# Patient Record
Sex: FEMALE | Race: BLACK OR AFRICAN AMERICAN | NOT HISPANIC OR LATINO | URBAN - METROPOLITAN AREA
[De-identification: names, ages, dates, MRNs, and addresses within clinical notes are randomized per-mention and may not be internally consistent; named-entity substitution may affect disease eponyms.]

---

## 2017-01-27 ENCOUNTER — OUTPATIENT (OUTPATIENT)
Dept: OUTPATIENT SERVICES | Facility: HOSPITAL | Age: 52
LOS: 1 days | End: 2017-01-27
Payer: COMMERCIAL

## 2017-01-27 ENCOUNTER — APPOINTMENT (OUTPATIENT)
Dept: MRI IMAGING | Facility: IMAGING CENTER | Age: 52
End: 2017-01-27

## 2017-01-27 DIAGNOSIS — Z00.8 ENCOUNTER FOR OTHER GENERAL EXAMINATION: ICD-10-CM

## 2017-01-27 DIAGNOSIS — Z90.710 ACQUIRED ABSENCE OF BOTH CERVIX AND UTERUS: Chronic | ICD-10-CM

## 2017-01-27 PROCEDURE — A9585: CPT

## 2017-01-27 PROCEDURE — 82565 ASSAY OF CREATININE: CPT

## 2017-01-27 PROCEDURE — 74183 MRI ABD W/O CNTR FLWD CNTR: CPT

## 2017-02-27 ENCOUNTER — RECORD ABSTRACTING (OUTPATIENT)
Age: 52
End: 2017-02-27

## 2017-02-27 DIAGNOSIS — Z84.89 FAMILY HISTORY OF OTHER SPECIFIED CONDITIONS: ICD-10-CM

## 2017-02-27 DIAGNOSIS — Z86.79 PERSONAL HISTORY OF OTHER DISEASES OF THE CIRCULATORY SYSTEM: ICD-10-CM

## 2017-02-27 DIAGNOSIS — Z78.9 OTHER SPECIFIED HEALTH STATUS: ICD-10-CM

## 2017-02-27 DIAGNOSIS — Z82.49 FAMILY HISTORY OF ISCHEMIC HEART DISEASE AND OTHER DISEASES OF THE CIRCULATORY SYSTEM: ICD-10-CM

## 2017-02-27 DIAGNOSIS — E66.01 MORBID (SEVERE) OBESITY DUE TO EXCESS CALORIES: ICD-10-CM

## 2017-02-27 DIAGNOSIS — I50.20 UNSPECIFIED SYSTOLIC (CONGESTIVE) HEART FAILURE: ICD-10-CM

## 2017-03-01 ENCOUNTER — APPOINTMENT (OUTPATIENT)
Dept: ELECTROPHYSIOLOGY | Facility: CLINIC | Age: 52
End: 2017-03-01

## 2017-03-01 ENCOUNTER — NON-APPOINTMENT (OUTPATIENT)
Age: 52
End: 2017-03-01

## 2017-03-01 VITALS
HEART RATE: 75 BPM | DIASTOLIC BLOOD PRESSURE: 85 MMHG | OXYGEN SATURATION: 97 % | HEIGHT: 65 IN | WEIGHT: 293 LBS | SYSTOLIC BLOOD PRESSURE: 117 MMHG | BODY MASS INDEX: 48.82 KG/M2

## 2017-03-01 DIAGNOSIS — I42.0 DILATED CARDIOMYOPATHY: ICD-10-CM

## 2017-03-01 DIAGNOSIS — I44.7 LEFT BUNDLE-BRANCH BLOCK, UNSPECIFIED: ICD-10-CM

## 2017-03-01 RX ORDER — LISINOPRIL 10 MG/1
10 TABLET ORAL DAILY
Qty: 30 | Refills: 0 | Status: ACTIVE | COMMUNITY

## 2017-03-01 RX ORDER — GABAPENTIN 600 MG/1
600 TABLET, COATED ORAL
Refills: 0 | Status: ACTIVE | COMMUNITY

## 2017-03-01 RX ORDER — SPIRONOLACTONE 25 MG/1
25 TABLET, FILM COATED ORAL DAILY
Qty: 30 | Refills: 1 | Status: ACTIVE | COMMUNITY

## 2017-03-01 RX ORDER — METOPROLOL SUCCINATE 100 MG/1
100 TABLET, EXTENDED RELEASE ORAL DAILY
Qty: 30 | Refills: 6 | Status: ACTIVE | COMMUNITY

## 2017-03-01 RX ORDER — VENLAFAXINE 75 MG/1
75 TABLET ORAL DAILY
Refills: 0 | Status: ACTIVE | COMMUNITY

## 2017-03-01 RX ORDER — ATORVASTATIN CALCIUM 20 MG/1
20 TABLET, FILM COATED ORAL
Qty: 30 | Refills: 5 | Status: ACTIVE | COMMUNITY

## 2017-03-01 RX ORDER — FUROSEMIDE 80 MG/1
80 TABLET ORAL
Refills: 0 | Status: ACTIVE | COMMUNITY

## 2017-04-28 ENCOUNTER — OUTPATIENT (OUTPATIENT)
Dept: INPATIENT UNIT | Facility: HOSPITAL | Age: 52
LOS: 1 days | End: 2017-04-28
Payer: COMMERCIAL

## 2017-04-28 VITALS
HEART RATE: 84 BPM | WEIGHT: 293 LBS | RESPIRATION RATE: 20 BRPM | DIASTOLIC BLOOD PRESSURE: 63 MMHG | SYSTOLIC BLOOD PRESSURE: 141 MMHG | TEMPERATURE: 98 F | HEIGHT: 65 IN | OXYGEN SATURATION: 95 %

## 2017-04-28 DIAGNOSIS — I42.8 OTHER CARDIOMYOPATHIES: ICD-10-CM

## 2017-04-28 DIAGNOSIS — I42 CARDIOMYOPATHY: ICD-10-CM

## 2017-04-28 DIAGNOSIS — Z90.710 ACQUIRED ABSENCE OF BOTH CERVIX AND UTERUS: Chronic | ICD-10-CM

## 2017-04-28 LAB
ALBUMIN SERPL ELPH-MCNC: 4 G/DL — SIGNIFICANT CHANGE UP (ref 3.3–5)
ALP SERPL-CCNC: 83 U/L — SIGNIFICANT CHANGE UP (ref 40–120)
ALT FLD-CCNC: 14 U/L RC — SIGNIFICANT CHANGE UP (ref 10–45)
ANION GAP SERPL CALC-SCNC: 14 MMOL/L — SIGNIFICANT CHANGE UP (ref 5–17)
AST SERPL-CCNC: 19 U/L — SIGNIFICANT CHANGE UP (ref 10–40)
BILIRUB SERPL-MCNC: 0.3 MG/DL — SIGNIFICANT CHANGE UP (ref 0.2–1.2)
BUN SERPL-MCNC: 23 MG/DL — SIGNIFICANT CHANGE UP (ref 7–23)
CALCIUM SERPL-MCNC: 9.9 MG/DL — SIGNIFICANT CHANGE UP (ref 8.4–10.5)
CHLORIDE SERPL-SCNC: 103 MMOL/L — SIGNIFICANT CHANGE UP (ref 96–108)
CO2 SERPL-SCNC: 26 MMOL/L — SIGNIFICANT CHANGE UP (ref 22–31)
CREAT SERPL-MCNC: 1.15 MG/DL — SIGNIFICANT CHANGE UP (ref 0.5–1.3)
GLUCOSE SERPL-MCNC: 148 MG/DL — HIGH (ref 70–99)
HCT VFR BLD CALC: 37 % — SIGNIFICANT CHANGE UP (ref 34.5–45)
HGB BLD-MCNC: 13.5 G/DL — SIGNIFICANT CHANGE UP (ref 11.5–15.5)
MCHC RBC-ENTMCNC: 29.9 PG — SIGNIFICANT CHANGE UP (ref 27–34)
MCHC RBC-ENTMCNC: 36.5 GM/DL — HIGH (ref 32–36)
MCV RBC AUTO: 81.8 FL — SIGNIFICANT CHANGE UP (ref 80–100)
PLATELET # BLD AUTO: 246 K/UL — SIGNIFICANT CHANGE UP (ref 150–400)
POTASSIUM SERPL-MCNC: 4.9 MMOL/L — SIGNIFICANT CHANGE UP (ref 3.5–5.3)
POTASSIUM SERPL-SCNC: 4.9 MMOL/L — SIGNIFICANT CHANGE UP (ref 3.5–5.3)
PROT SERPL-MCNC: 8.5 G/DL — HIGH (ref 6–8.3)
RBC # BLD: 4.52 M/UL — SIGNIFICANT CHANGE UP (ref 3.8–5.2)
RBC # FLD: 13 % — SIGNIFICANT CHANGE UP (ref 10.3–14.5)
SODIUM SERPL-SCNC: 143 MMOL/L — SIGNIFICANT CHANGE UP (ref 135–145)
WBC # BLD: 16.6 K/UL — HIGH (ref 3.8–10.5)
WBC # FLD AUTO: 16.6 K/UL — HIGH (ref 3.8–10.5)

## 2017-04-28 PROCEDURE — 33249 INSJ/RPLCMT DEFIB W/LEAD(S): CPT | Mod: 59,Q0

## 2017-04-28 PROCEDURE — 99152 MOD SED SAME PHYS/QHP 5/>YRS: CPT

## 2017-04-28 PROCEDURE — 93010 ELECTROCARDIOGRAM REPORT: CPT

## 2017-04-28 PROCEDURE — 33225 L VENTRIC PACING LEAD ADD-ON: CPT

## 2017-04-28 RX ORDER — CEFAZOLIN SODIUM 1 G
1000 VIAL (EA) INJECTION EVERY 8 HOURS
Qty: 0 | Refills: 0 | Status: COMPLETED | OUTPATIENT
Start: 2017-04-29 | End: 2017-04-29

## 2017-04-28 NOTE — H&P CARDIOLOGY - HISTORY OF PRESENT ILLNESS
53 yo female with PMHx of morbid obesity, HTN, LBBB & non ischemic cardiomyopathy, p/w with SOB & Lower extremity edema, uses 2 pillow  to sleep, an angiogram in 2016 reveals no significant coronary disease, Echo in december 2016 revelas reduced EF , pt has ongoingnon ischemic cardiomyopathy, class III HF, & LBBB now presents for BIV implant.

## 2017-04-28 NOTE — H&P CARDIOLOGY - PMH
Congestive heart failure (CHF)    Diabetes mellitus    Essential hypertension    HLD (hyperlipidemia)    Obesity    Other depression

## 2017-04-29 ENCOUNTER — TRANSCRIPTION ENCOUNTER (OUTPATIENT)
Age: 52
End: 2017-04-29

## 2017-04-29 VITALS
RESPIRATION RATE: 18 BRPM | TEMPERATURE: 98 F | OXYGEN SATURATION: 96 % | SYSTOLIC BLOOD PRESSURE: 128 MMHG | HEART RATE: 78 BPM | DIASTOLIC BLOOD PRESSURE: 81 MMHG

## 2017-04-29 LAB
ANION GAP SERPL CALC-SCNC: 15 MMOL/L — SIGNIFICANT CHANGE UP (ref 5–17)
BUN SERPL-MCNC: 23 MG/DL — SIGNIFICANT CHANGE UP (ref 7–23)
CALCIUM SERPL-MCNC: 8.7 MG/DL — SIGNIFICANT CHANGE UP (ref 8.4–10.5)
CHLORIDE SERPL-SCNC: 100 MMOL/L — SIGNIFICANT CHANGE UP (ref 96–108)
CO2 SERPL-SCNC: 24 MMOL/L — SIGNIFICANT CHANGE UP (ref 22–31)
CREAT SERPL-MCNC: 1.12 MG/DL — SIGNIFICANT CHANGE UP (ref 0.5–1.3)
GLUCOSE SERPL-MCNC: 249 MG/DL — HIGH (ref 70–99)
HCT VFR BLD CALC: 37.1 % — SIGNIFICANT CHANGE UP (ref 34.5–45)
HGB BLD-MCNC: 12.6 G/DL — SIGNIFICANT CHANGE UP (ref 11.5–15.5)
MCHC RBC-ENTMCNC: 28.4 PG — SIGNIFICANT CHANGE UP (ref 27–34)
MCHC RBC-ENTMCNC: 34 GM/DL — SIGNIFICANT CHANGE UP (ref 32–36)
MCV RBC AUTO: 83.4 FL — SIGNIFICANT CHANGE UP (ref 80–100)
PLATELET # BLD AUTO: 239 K/UL — SIGNIFICANT CHANGE UP (ref 150–400)
POTASSIUM SERPL-MCNC: 3.8 MMOL/L — SIGNIFICANT CHANGE UP (ref 3.5–5.3)
POTASSIUM SERPL-SCNC: 3.8 MMOL/L — SIGNIFICANT CHANGE UP (ref 3.5–5.3)
RBC # BLD: 4.45 M/UL — SIGNIFICANT CHANGE UP (ref 3.8–5.2)
RBC # FLD: 12.7 % — SIGNIFICANT CHANGE UP (ref 10.3–14.5)
SODIUM SERPL-SCNC: 139 MMOL/L — SIGNIFICANT CHANGE UP (ref 135–145)
WBC # BLD: 17.7 K/UL — HIGH (ref 3.8–10.5)
WBC # FLD AUTO: 17.7 K/UL — HIGH (ref 3.8–10.5)

## 2017-04-29 PROCEDURE — C1777: CPT

## 2017-04-29 PROCEDURE — 71045 X-RAY EXAM CHEST 1 VIEW: CPT

## 2017-04-29 PROCEDURE — 99153 MOD SED SAME PHYS/QHP EA: CPT

## 2017-04-29 PROCEDURE — C1769: CPT

## 2017-04-29 PROCEDURE — C1892: CPT

## 2017-04-29 PROCEDURE — 99152 MOD SED SAME PHYS/QHP 5/>YRS: CPT

## 2017-04-29 PROCEDURE — 80053 COMPREHEN METABOLIC PANEL: CPT

## 2017-04-29 PROCEDURE — 80048 BASIC METABOLIC PNL TOTAL CA: CPT

## 2017-04-29 PROCEDURE — 85027 COMPLETE CBC AUTOMATED: CPT

## 2017-04-29 PROCEDURE — C1887: CPT

## 2017-04-29 PROCEDURE — 93005 ELECTROCARDIOGRAM TRACING: CPT

## 2017-04-29 PROCEDURE — 93010 ELECTROCARDIOGRAM REPORT: CPT

## 2017-04-29 PROCEDURE — 33225 L VENTRIC PACING LEAD ADD-ON: CPT

## 2017-04-29 PROCEDURE — 33249 INSJ/RPLCMT DEFIB W/LEAD(S): CPT | Mod: Q0

## 2017-04-29 PROCEDURE — C1900: CPT

## 2017-04-29 PROCEDURE — C1898: CPT

## 2017-04-29 RX ORDER — ACETAMINOPHEN 500 MG
650 TABLET ORAL EVERY 6 HOURS
Qty: 0 | Refills: 0 | Status: DISCONTINUED | OUTPATIENT
Start: 2017-04-29 | End: 2017-04-29

## 2017-04-29 RX ORDER — INSULIN LISPRO 100/ML
VIAL (ML) SUBCUTANEOUS
Qty: 0 | Refills: 0 | Status: DISCONTINUED | OUTPATIENT
Start: 2017-04-29 | End: 2017-04-29

## 2017-04-29 RX ORDER — DEXTROSE 50 % IN WATER 50 %
12.5 SYRINGE (ML) INTRAVENOUS ONCE
Qty: 0 | Refills: 0 | Status: DISCONTINUED | OUTPATIENT
Start: 2017-04-29 | End: 2017-04-29

## 2017-04-29 RX ORDER — DEXTROSE 50 % IN WATER 50 %
25 SYRINGE (ML) INTRAVENOUS ONCE
Qty: 0 | Refills: 0 | Status: DISCONTINUED | OUTPATIENT
Start: 2017-04-29 | End: 2017-04-29

## 2017-04-29 RX ORDER — DEXTROSE 50 % IN WATER 50 %
1 SYRINGE (ML) INTRAVENOUS ONCE
Qty: 0 | Refills: 0 | Status: DISCONTINUED | OUTPATIENT
Start: 2017-04-29 | End: 2017-04-29

## 2017-04-29 RX ORDER — GLUCAGON INJECTION, SOLUTION 0.5 MG/.1ML
1 INJECTION, SOLUTION SUBCUTANEOUS ONCE
Qty: 0 | Refills: 0 | Status: DISCONTINUED | OUTPATIENT
Start: 2017-04-29 | End: 2017-04-29

## 2017-04-29 RX ORDER — INSULIN LISPRO 100/ML
VIAL (ML) SUBCUTANEOUS AT BEDTIME
Qty: 0 | Refills: 0 | Status: DISCONTINUED | OUTPATIENT
Start: 2017-04-29 | End: 2017-04-29

## 2017-04-29 RX ORDER — LISINOPRIL 2.5 MG/1
10 TABLET ORAL DAILY
Qty: 0 | Refills: 0 | Status: DISCONTINUED | OUTPATIENT
Start: 2017-04-29 | End: 2017-04-29

## 2017-04-29 RX ORDER — METOPROLOL TARTRATE 50 MG
150 TABLET ORAL DAILY
Qty: 0 | Refills: 0 | Status: DISCONTINUED | OUTPATIENT
Start: 2017-04-29 | End: 2017-04-29

## 2017-04-29 RX ORDER — SODIUM CHLORIDE 9 MG/ML
1000 INJECTION, SOLUTION INTRAVENOUS
Qty: 0 | Refills: 0 | Status: DISCONTINUED | OUTPATIENT
Start: 2017-04-29 | End: 2017-04-29

## 2017-04-29 RX ORDER — GABAPENTIN 400 MG/1
300 CAPSULE ORAL
Qty: 0 | Refills: 0 | Status: DISCONTINUED | OUTPATIENT
Start: 2017-04-29 | End: 2017-04-29

## 2017-04-29 RX ORDER — ACETAMINOPHEN 500 MG
2 TABLET ORAL
Qty: 0 | Refills: 0 | COMMUNITY
Start: 2017-04-29

## 2017-04-29 RX ORDER — FUROSEMIDE 40 MG
80 TABLET ORAL DAILY
Qty: 0 | Refills: 0 | Status: DISCONTINUED | OUTPATIENT
Start: 2017-04-29 | End: 2017-04-29

## 2017-04-29 RX ORDER — ATORVASTATIN CALCIUM 80 MG/1
20 TABLET, FILM COATED ORAL AT BEDTIME
Qty: 0 | Refills: 0 | Status: DISCONTINUED | OUTPATIENT
Start: 2017-04-29 | End: 2017-04-29

## 2017-04-29 RX ORDER — VENLAFAXINE HCL 75 MG
75 CAPSULE, EXT RELEASE 24 HR ORAL DAILY
Qty: 0 | Refills: 0 | Status: DISCONTINUED | OUTPATIENT
Start: 2017-04-29 | End: 2017-04-29

## 2017-04-29 RX ADMIN — GABAPENTIN 300 MILLIGRAM(S): 400 CAPSULE ORAL at 05:51

## 2017-04-29 RX ADMIN — Medication 650 MILLIGRAM(S): at 17:52

## 2017-04-29 RX ADMIN — Medication 650 MILLIGRAM(S): at 17:01

## 2017-04-29 RX ADMIN — Medication 650 MILLIGRAM(S): at 09:00

## 2017-04-29 RX ADMIN — Medication 650 MILLIGRAM(S): at 09:45

## 2017-04-29 RX ADMIN — LISINOPRIL 10 MILLIGRAM(S): 2.5 TABLET ORAL at 05:51

## 2017-04-29 RX ADMIN — Medication 75 MILLIGRAM(S): at 10:26

## 2017-04-29 RX ADMIN — Medication 100 MILLIGRAM(S): at 03:18

## 2017-04-29 RX ADMIN — Medication 100 MILLIGRAM(S): at 17:01

## 2017-04-29 RX ADMIN — Medication 100 MILLIGRAM(S): at 10:26

## 2017-04-29 RX ADMIN — Medication 150 MILLIGRAM(S): at 05:51

## 2017-04-29 RX ADMIN — Medication 80 MILLIGRAM(S): at 05:51

## 2017-04-29 RX ADMIN — GABAPENTIN 300 MILLIGRAM(S): 400 CAPSULE ORAL at 17:01

## 2017-04-29 NOTE — DISCHARGE NOTE ADULT - CARE PROVIDERS DIRECT ADDRESSES
,DirectAddress_Unknown,[email or web address],DirectAddress_Unknown,[email or web address]

## 2017-04-29 NOTE — DISCHARGE NOTE ADULT - PATIENT PORTAL LINK FT
“You can access the FollowHealth Patient Portal, offered by [facility], by registering with the following website: [email or web address]”

## 2017-04-29 NOTE — DISCHARGE NOTE ADULT - CARE PROVIDER_API CALL
Dr Hyde,   Phone: (   )    -  Fax: (   )    -    Belem Marinelli), Cardiac Electrophysiology; Cardiovascular Disease  [address] [place] [address]  Phone: [phone]  Fax: [phone]    Reggie Wilburn (), Cardiology; Internal Medicine  [address]  [place] [address]  Phone: [phone]  Fax: [phone]

## 2017-04-29 NOTE — DISCHARGE NOTE ADULT - CARE PLAN
Principal Discharge DX:	Cardiomyopathy, dilated, nonischemic  Goal:	Your incision will be without infection. Your heartbeat will remain controlled.  Instructions for follow-up, activity and diet:	Your AICD can sense and treat certain abnormal heart beats  If your AICD gives you a shock (you will probably feel it), let your doctor know so he/she can check the device & make changes in the device as needed or change your medication.  Check your device as directed on a regular basis  Talk to your doctor about driving permission  Avoid electric or magnetic equipment   If you are not able to use metal detectors at airports, ask for hand security search  Tell any doctors or nurses that you have an AICD  You cannot have an MRI  You may want to get a medical alert bracelet indicating that you have an AICD  Follow directions your doctor gave you regarding arm movement & exercises, lifting, sling use.  Always carry your AICD card in your wallet.  It is important to know what brand of AICD you have in case of emergency  Secondary Diagnosis:	Type 2 diabetes mellitus without complication, without long-term current use of insulin  Goal:	Your hemoglobin A1C will be between 7-8  Instructions for follow-up, activity and diet:	Continue to follow with your primary care MD or your endocrinologist.  Follow a heart healthy diabetic diet. If you check your fingerstick glucose at home, call your MD if it is greater than 250mg/dL on 2 occasions or less than 100mg/dL on 2 occasions. Know signs of low blood sugar, such as: dizziness, shakiness, sweating, confusion, hunger, nervousness-drink 4 ounces apple juice if occurs and call your doctor. Know early signs of high blood sugar, such as: frequent urination, increased thirst, blurry vision, fatigue, headache - call your doctor if this occurs. Follow with other practitioners to care for your diabetes, such as ophthamologist and podiatrist.  Secondary Diagnosis:	Hyperlipidemia, unspecified hyperlipidemia type  Goal:	Your LDL cholesterol will be less than 70mg/dL  Instructions for follow-up, activity and diet:	Continue with your cholesterol medications. Eat a heart healthy diet that is low in saturated fats and salt, and includes whole grains, fruits, vegetables and lean protein; exercise regularly (consult with your physician or cardiologist first); maintain a heart healthy weight; if you smoke - quit (A resource to help you stop smoking is the [facility] – phone number [phone].). Continue to follow with your primary physician or cardiologist. Principal Discharge DX:	Cardiomyopathy, dilated, nonischemic  Goal:	Your incision will be without infection. Your heartbeat will remain controlled.  Instructions for follow-up, activity and diet:	Your AICD can sense and treat certain abnormal heart beats  If your AICD gives you a shock (you will probably feel it), let your doctor know so he/she can check the device & make changes in the device as needed or change your medication.  Check your device as directed on a regular basis  Talk to your doctor about driving permission  Avoid electric or magnetic equipment   If you are not able to use metal detectors at airports, ask for hand security search  Tell any doctors or nurses that you have an AICD  You cannot have an MRI  You may want to get a medical alert bracelet indicating that you have an AICD  Follow directions your doctor gave you regarding arm movement & exercises, lifting, sling use.  Always carry your AICD card in your wallet.  It is important to know what brand of AICD you have in case of emergency  Secondary Diagnosis:	Type 2 diabetes mellitus without complication, without long-term current use of insulin  Goal:	Your hemoglobin A1C will be between 7-8  Instructions for follow-up, activity and diet:	Continue to follow with your primary care MD or your endocrinologist.  Follow a heart healthy diabetic diet. If you check your fingerstick glucose at home, call your MD if it is greater than 250mg/dL on 2 occasions or less than 100mg/dL on 2 occasions. Know signs of low blood sugar, such as: dizziness, shakiness, sweating, confusion, hunger, nervousness-drink 4 ounces apple juice if occurs and call your doctor. Know early signs of high blood sugar, such as: frequent urination, increased thirst, blurry vision, fatigue, headache - call your doctor if this occurs. Follow with other practitioners to care for your diabetes, such as ophthamologist and podiatrist.  Secondary Diagnosis:	Hyperlipidemia, unspecified hyperlipidemia type  Goal:	Your LDL cholesterol will be less than 70mg/dL  Instructions for follow-up, activity and diet:	Continue with your cholesterol medications. Eat a heart healthy diet that is low in saturated fats and salt, and includes whole grains, fruits, vegetables and lean protein; exercise regularly (consult with your physician or cardiologist first); maintain a heart healthy weight; if you smoke - quit (A resource to help you stop smoking is the [facility] – phone number [phone].). Continue to follow with your primary physician or cardiologist. Principal Discharge DX:	Cardiomyopathy, dilated, nonischemic  Goal:	Your incision will be without infection. Your heartbeat will remain controlled.  Instructions for follow-up, activity and diet:	Your AICD can sense and treat certain abnormal heart beats  If your AICD gives you a shock (you will probably feel it), let your doctor know so he/she can check the device & make changes in the device as needed or change your medication.  Check your device as directed on a regular basis  Talk to your doctor about driving permission  Avoid electric or magnetic equipment   If you are not able to use metal detectors at airports, ask for hand security search  Tell any doctors or nurses that you have an AICD  You cannot have an MRI  You may want to get a medical alert bracelet indicating that you have an AICD  Follow directions your doctor gave you regarding arm movement & exercises, lifting, sling use.  Always carry your AICD card in your wallet.  It is important to know what brand of AICD you have in case of emergency  Secondary Diagnosis:	Type 2 diabetes mellitus without complication, without long-term current use of insulin  Goal:	Your hemoglobin A1C will be between 7-8  Instructions for follow-up, activity and diet:	Continue to follow with your primary care MD or your endocrinologist.  Follow a heart healthy diabetic diet. If you check your fingerstick glucose at home, call your MD if it is greater than 250mg/dL on 2 occasions or less than 100mg/dL on 2 occasions. Know signs of low blood sugar, such as: dizziness, shakiness, sweating, confusion, hunger, nervousness-drink 4 ounces apple juice if occurs and call your doctor. Know early signs of high blood sugar, such as: frequent urination, increased thirst, blurry vision, fatigue, headache - call your doctor if this occurs. Follow with other practitioners to care for your diabetes, such as ophthamologist and podiatrist.  Secondary Diagnosis:	Hyperlipidemia, unspecified hyperlipidemia type  Goal:	Your LDL cholesterol will be less than 70mg/dL  Instructions for follow-up, activity and diet:	Continue with your cholesterol medications. Eat a heart healthy diet that is low in saturated fats and salt, and includes whole grains, fruits, vegetables and lean protein; exercise regularly (consult with your physician or cardiologist first); maintain a heart healthy weight; if you smoke - quit (A resource to help you stop smoking is the [facility] – phone number [phone].). Continue to follow with your primary physician or cardiologist.

## 2017-04-29 NOTE — DISCHARGE NOTE ADULT - PLAN OF CARE
Your AICD can sense and treat certain abnormal heart beats  If your AICD gives you a shock (you will probably feel it), let your doctor know so he/she can check the device & make changes in the device as needed or change your medication.  Check your device as directed on a regular basis  Talk to your doctor about driving permission  Avoid electric or magnetic equipment   If you are not able to use metal detectors at airports, ask for hand security search  Tell any doctors or nurses that you have an AICD  You cannot have an MRI  You may want to get a medical alert bracelet indicating that you have an AICD  Follow directions your doctor gave you regarding arm movement & exercises, lifting, sling use.  Always carry your AICD card in your wallet.  It is important to know what brand of AICD you have in case of emergency Your incision will be without infection. Your heartbeat will remain controlled. Your hemoglobin A1C will be between 7-8 Continue to follow with your primary care MD or your endocrinologist.  Follow a heart healthy diabetic diet. If you check your fingerstick glucose at home, call your MD if it is greater than 250mg/dL on 2 occasions or less than 100mg/dL on 2 occasions. Know signs of low blood sugar, such as: dizziness, shakiness, sweating, confusion, hunger, nervousness-drink 4 ounces apple juice if occurs and call your doctor. Know early signs of high blood sugar, such as: frequent urination, increased thirst, blurry vision, fatigue, headache - call your doctor if this occurs. Follow with other practitioners to care for your diabetes, such as ophthamologist and podiatrist. Continue with your cholesterol medications. Eat a heart healthy diet that is low in saturated fats and salt, and includes whole grains, fruits, vegetables and lean protein; exercise regularly (consult with your physician or cardiologist first); maintain a heart healthy weight; if you smoke - quit (A resource to help you stop smoking is the [facility] – phone number [phone].). Continue to follow with your primary physician or cardiologist. Your LDL cholesterol will be less than 70mg/dL

## 2017-04-29 NOTE — DISCHARGE NOTE ADULT - ADDITIONAL INSTRUCTIONS
Follow up for [facility] 5[facility]10/17 at 925am  Follow up with Dr. Hyde, PCP for your diabetes.

## 2017-04-29 NOTE — DISCHARGE NOTE ADULT - SECONDARY DIAGNOSIS.
Type 2 diabetes mellitus without complication, without long-term current use of insulin Hyperlipidemia, unspecified hyperlipidemia type

## 2017-04-29 NOTE — DISCHARGE NOTE ADULT - PROVIDER TOKENS
FREE:[LAST:[Dr Hyde],PHONE:[(   )    -],FAX:[(   )    -]],TOKEN:'[id number]:'[id number]'

## 2017-04-29 NOTE — DISCHARGE NOTE ADULT - HOSPITAL COURSE
53 y/o morbidly obese female (BMI 49.9) with pmh of HTN, DM2 (AIC 6.7) diet controlled uncomplicated with LBBB & non ischemic cardiomyopathy s/p angiogram 10/2016 with LVEF 35% and Normal coronaries presented with SOB & Lower extremity edema, using 2 pillows to sleep with NYHF class III, TTE December 2016 with reduced EF 23% seen and evaluated by Dr. JAGJIT Marinelli and presents BIV ICD implant.  Pt s/p implant LCW with no site complications seen and evaluated by EP and cleared for d/c home.  Pt to follow up with Dr. Hyde, PCP regarding her diabetes within the next two weeks as per pt's request.  Pt tolerated procedure well with no post procedural complications and hospitalization remained uneventful. Pt stable for discharge home.

## 2017-04-29 NOTE — DISCHARGE NOTE ADULT - MEDICATION SUMMARY - MEDICATIONS TO TAKE
I will START or STAY ON the medications listed below when I get home from the hospital:    acetaminophen 325 mg oral tablet  -- 2 tab(s) by mouth every 6 hours, As needed, Moderate Pain (4 - 6)  -- Indication: For incisional discomfort    lisinopril 10 mg oral tablet  -- 1 tab(s) by mouth once a day  -- Indication: For heart failure    gabapentin 300 mg oral capsule  -- 1 tab(s) by mouth 2 times a day  -- Indication: For peripheral neuropathy    venlafaxine 75 mg oral capsule, extended release  -- 3 cap(s) by mouth once a day  -- Indication: For depression    Lipitor 20 mg oral tablet  -- 1 tab(s) by mouth once a day  -- Indication: For hyperlipidemia    Metoprolol Succinate ER  -- 150 milligram(s) by mouth once a day  -- Indication: For Heart failure    furosemide 80 mg oral tablet  -- 1 tab(s) by mouth once a day  -- Indication: For heart failure

## 2017-05-04 RX ORDER — METOPROLOL TARTRATE 50 MG
150 TABLET ORAL
Qty: 30 | Refills: 0 | COMMUNITY

## 2017-05-04 RX ORDER — METOPROLOL TARTRATE 50 MG
1 TABLET ORAL
Qty: 30 | Refills: 0 | COMMUNITY

## 2017-05-16 ENCOUNTER — APPOINTMENT (OUTPATIENT)
Dept: ELECTROPHYSIOLOGY | Facility: CLINIC | Age: 52
End: 2017-05-16

## 2017-05-16 ENCOUNTER — NON-APPOINTMENT (OUTPATIENT)
Age: 52
End: 2017-05-16

## 2017-05-16 VITALS — SYSTOLIC BLOOD PRESSURE: 141 MMHG | DIASTOLIC BLOOD PRESSURE: 84 MMHG | OXYGEN SATURATION: 100 % | HEART RATE: 79 BPM

## 2017-06-26 ENCOUNTER — OUTPATIENT (OUTPATIENT)
Dept: OUTPATIENT SERVICES | Facility: HOSPITAL | Age: 52
LOS: 1 days | Discharge: HOME | End: 2017-06-26

## 2017-06-26 DIAGNOSIS — Z90.710 ACQUIRED ABSENCE OF BOTH CERVIX AND UTERUS: Chronic | ICD-10-CM

## 2017-06-27 DIAGNOSIS — F32.9 MAJOR DEPRESSIVE DISORDER, SINGLE EPISODE, UNSPECIFIED: ICD-10-CM

## 2017-11-17 ENCOUNTER — APPOINTMENT (OUTPATIENT)
Dept: ELECTROPHYSIOLOGY | Facility: CLINIC | Age: 52
End: 2017-11-17

## 2018-07-24 PROBLEM — I42.0 DILATED CARDIOMYOPATHY: Status: ACTIVE | Noted: 2017-03-01

## 2018-08-03 ENCOUNTER — OUTPATIENT (OUTPATIENT)
Dept: OUTPATIENT SERVICES | Facility: HOSPITAL | Age: 53
LOS: 1 days | Discharge: HOME | End: 2018-08-03

## 2018-08-03 DIAGNOSIS — Z90.710 ACQUIRED ABSENCE OF BOTH CERVIX AND UTERUS: Chronic | ICD-10-CM

## 2018-08-03 PROBLEM — E66.9 OBESITY, UNSPECIFIED: Chronic | Status: ACTIVE | Noted: 2017-04-28

## 2018-08-06 DIAGNOSIS — F40.02 AGORAPHOBIA WITHOUT PANIC DISORDER: ICD-10-CM

## 2018-08-06 DIAGNOSIS — F32.9 MAJOR DEPRESSIVE DISORDER, SINGLE EPISODE, UNSPECIFIED: ICD-10-CM

## 2018-08-06 DIAGNOSIS — E66.1 DRUG-INDUCED OBESITY: ICD-10-CM

## 2018-08-06 DIAGNOSIS — E66.01 MORBID (SEVERE) OBESITY DUE TO EXCESS CALORIES: ICD-10-CM

## 2018-08-06 DIAGNOSIS — F42.3 HOARDING DISORDER: ICD-10-CM

## 2024-10-22 ENCOUNTER — EMERGENCY (EMERGENCY)
Facility: HOSPITAL | Age: 59
LOS: 1 days | Discharge: HOME | End: 2024-10-22
Attending: EMERGENCY MEDICINE
Payer: MEDICARE

## 2024-10-22 VITALS
HEART RATE: 99 BPM | WEIGHT: 279.99 LBS | RESPIRATION RATE: 20 BRPM | HEIGHT: 65 IN | SYSTOLIC BLOOD PRESSURE: 165 MMHG | OXYGEN SATURATION: 95 % | DIASTOLIC BLOOD PRESSURE: 96 MMHG

## 2024-10-22 VITALS
TEMPERATURE: 98 F | HEART RATE: 98 BPM | OXYGEN SATURATION: 95 % | SYSTOLIC BLOOD PRESSURE: 150 MMHG | DIASTOLIC BLOOD PRESSURE: 88 MMHG | RESPIRATION RATE: 20 BRPM

## 2024-10-22 DIAGNOSIS — R04.0 EPISTAXIS: ICD-10-CM

## 2024-10-22 DIAGNOSIS — Z90.710 ACQUIRED ABSENCE OF BOTH CERVIX AND UTERUS: Chronic | ICD-10-CM

## 2024-10-22 LAB
ALBUMIN SERPL ELPH-MCNC: 4.1 G/DL — SIGNIFICANT CHANGE UP (ref 3.3–5)
ALP SERPL-CCNC: 71 U/L — SIGNIFICANT CHANGE UP (ref 40–120)
ALT FLD-CCNC: 11 U/L — SIGNIFICANT CHANGE UP (ref 10–45)
ANION GAP SERPL CALC-SCNC: 15 MMOL/L — SIGNIFICANT CHANGE UP (ref 5–17)
APTT BLD: 28.7 SEC — SIGNIFICANT CHANGE UP (ref 24.5–35.6)
AST SERPL-CCNC: 13 U/L — SIGNIFICANT CHANGE UP (ref 10–40)
BILIRUB SERPL-MCNC: 0.5 MG/DL — SIGNIFICANT CHANGE UP (ref 0.2–1.2)
BUN SERPL-MCNC: 21 MG/DL — SIGNIFICANT CHANGE UP (ref 7–23)
CALCIUM SERPL-MCNC: 10.3 MG/DL — SIGNIFICANT CHANGE UP (ref 8.4–10.5)
CHLORIDE SERPL-SCNC: 102 MMOL/L — SIGNIFICANT CHANGE UP (ref 96–108)
CO2 SERPL-SCNC: 24 MMOL/L — SIGNIFICANT CHANGE UP (ref 22–31)
CREAT SERPL-MCNC: 1.09 MG/DL — SIGNIFICANT CHANGE UP (ref 0.5–1.3)
EGFR: 58 ML/MIN/1.73M2 — LOW
GLUCOSE SERPL-MCNC: 99 MG/DL — SIGNIFICANT CHANGE UP (ref 70–99)
HCT VFR BLD CALC: 35.1 % — SIGNIFICANT CHANGE UP (ref 34.5–45)
HGB BLD-MCNC: 11.4 G/DL — LOW (ref 11.5–15.5)
INR BLD: 1.2 RATIO — HIGH (ref 0.85–1.16)
MCHC RBC-ENTMCNC: 25.4 PG — LOW (ref 27–34)
MCHC RBC-ENTMCNC: 32.5 GM/DL — SIGNIFICANT CHANGE UP (ref 32–36)
MCV RBC AUTO: 78.2 FL — LOW (ref 80–100)
NRBC # BLD: 0 /100 WBCS — SIGNIFICANT CHANGE UP (ref 0–0)
PLATELET # BLD AUTO: 329 K/UL — SIGNIFICANT CHANGE UP (ref 150–400)
POTASSIUM SERPL-MCNC: 3.8 MMOL/L — SIGNIFICANT CHANGE UP (ref 3.5–5.3)
POTASSIUM SERPL-SCNC: 3.8 MMOL/L — SIGNIFICANT CHANGE UP (ref 3.5–5.3)
PROT SERPL-MCNC: 8 G/DL — SIGNIFICANT CHANGE UP (ref 6–8.3)
PROTHROM AB SERPL-ACNC: 13.7 SEC — HIGH (ref 9.9–13.4)
RBC # BLD: 4.49 M/UL — SIGNIFICANT CHANGE UP (ref 3.8–5.2)
RBC # FLD: 18.4 % — HIGH (ref 10.3–14.5)
SODIUM SERPL-SCNC: 141 MMOL/L — SIGNIFICANT CHANGE UP (ref 135–145)
WBC # BLD: 14.89 K/UL — HIGH (ref 3.8–10.5)
WBC # FLD AUTO: 14.89 K/UL — HIGH (ref 3.8–10.5)

## 2024-10-22 PROCEDURE — 99285 EMERGENCY DEPT VISIT HI MDM: CPT | Mod: FS

## 2024-10-22 PROCEDURE — 31238 NSL/SINS NDSC SRG NSL HEMRRG: CPT | Mod: LT

## 2024-10-22 PROCEDURE — 99284 EMERGENCY DEPT VISIT MOD MDM: CPT | Mod: 25

## 2024-10-22 RX ORDER — ACETAMINOPHEN 325 MG
975 TABLET ORAL ONCE
Refills: 0 | Status: COMPLETED | OUTPATIENT
Start: 2024-10-22 | End: 2024-10-22

## 2024-10-22 RX ORDER — PHENYLEPHRINE HCL 0.5 %
1 SPRAY, NON-AEROSOL (ML) NASAL ONCE
Refills: 0 | Status: ACTIVE | OUTPATIENT
Start: 2024-10-22 | End: 2024-10-22

## 2024-10-22 RX ADMIN — Medication 975 MILLIGRAM(S): at 18:22

## 2024-10-22 NOTE — ED PROVIDER NOTE - PROGRESS NOTE DETAILS
Attending MD Concepcion: ENT at bedside with patient Attending MD Concepcion: Seen by ENT team with attending, reports nasospore placed, reports will self-dissolve, NO antibiotics at this time, recommend nasal saline spray OTC four times a day, and should follow up with Dr. Frye within a week. ENT evaluated pt however at this time she reports she is better tolerating the nasal packing and feels well, would like to be DC'ed home. Discussed DC plan and return precautions, all questions answered. -Miller Fry PA-C pt c/o nasal congestion requesting removal of nasal packing as she is poorly tolerating it, d/w ENT PA who reports they will d/w attending and speak to the pt. -Miller Fry PA-C Attending MD Concepcion: Patient re-evaluated by ENT, verbalizes feeling improve, ambulating to bathroom, reports feels better than when initially placed.  Stable for discharge pending repeat vitals. Attending MD Concepcion: Repeat vitals noted, discussed following up with PMD for elevated BP, requested Tylenol for HA, will give prior to discharge. Follow up instructions given, importance of follow up emphasized, return to ED parameters reviewed and patient verbalized understanding.  All questions answered, all concerns addressed.

## 2024-10-22 NOTE — CONSULT NOTE ADULT - PROBLEM SELECTOR RECOMMENDATION 9
~~~NOTE IN PROGRESS~~~ - Strict blood pressure control.  - Nasal saline, 2 sprays to both nares 4 times a day  - Avoid nasal trauma; no nose rubbing, blowing or manipulating nasal packing. Sneeze with mouth open and pinching nares.  - Avoid bending with head below the waist.    - No heavy lifting   - Patient should follow up in ENT office as an outpatient with Dr. Frye. Call [phone].

## 2024-10-22 NOTE — ED PROVIDER NOTE - SHIFT CHANGE DETAILS
Attending MD Concepcion: 59F from [facility], on 81mg ASA, here for L sided epistaxis, recurrent.  ENT saw patient here, no intervention, bleeding stopped spontaneously.  Plan to observe patient until 6pm and if remains stable, can be discharged back to [facility] with nonemergent.

## 2024-10-22 NOTE — ED PROVIDER NOTE - PATIENT PORTAL LINK FT
You can access the FollowMyHealth Patient Portal offered by [facility] by registering at the following website: [email or web address]. By joining [employer]’s FollowMyHealth portal, you will also be able to view your health information using other applications (apps) compatible with our system.

## 2024-10-22 NOTE — ED ADULT NURSE NOTE - OBJECTIVE STATEMENT
59 year old female, A&XO4, PMH stroke in July 2024 with speech deficits, DM, CHF, HTN, HLD, on daily ASA, presenting to ED complaining of nose bleed. Patient states she had a nose bleed last week that resolved on its own. Patient states today nose has been bleeding for approximately 30 minutes. Denies CP, SOB, HA, n/v/d, abdominal pain, difficulty urinating, fevers and chills. Patient was at rehab center after stroke until 3 weeks ago and has been ambulating with walker. Heart rate  regular. Respirations clear and equal bilaterally. Abdomen soft, nontender and nondistended. Peripheral pulses strong and equal bilaterally. Safety and comfort measures maintained.

## 2024-10-22 NOTE — ED PROVIDER NOTE - CLINICAL SUMMARY MEDICAL DECISION MAKING FREE TEXT BOX
L epistaxis c large clots.  Limited exam 2/2 habitus and pt cooperation.  Not on AP/AC.  Possibly related to hypertension but no end organ ischemia clinically so not true emergency requiring emergent BP reduction.  Needs local control, labs, ENT for eval, reassess; may require packing.  Doubt posterior source but possible.  --Sergio Bernal MD, Attending Physician

## 2024-10-22 NOTE — ED PROVIDER NOTE - ATTENDING APP SHARED VISIT CONTRIBUTION OF CARE
I have reviewed this note, the presenting symptoms, and the Chief Complaint and the History of Present Illness as documented, with the other care provider(s) including resident, ACP, and nurses on the patient care team. I have also reviewed this patient's past medical/surgical history and social/family history as reviewed and listed in this electronic medical record.  I agree with the resident/ACP documentation except where noted otherwise in my personal documentation.  See MDM.  --Sergio Bernal MD, Attending Physician

## 2024-10-22 NOTE — CONSULT NOTE ADULT - SUBJECTIVE AND OBJECTIVE BOX
CC: Left nose bleed    HPI: 59 year old female with PMH of CVA in July 2024 with speech deficits, DM, CHF, HTN, HLD, on daily ASA, presenting to ED complaining of left-sided nose bleed. Patient states she had a nose bleed last week that resolved on its own. Denies CP, SOB, HA, n/v/d, abdominal pain, difficulty urinating, fevers and chills.     ENT consulted for evaluation of epistaxis. In the ED, pt was given phenylephrine nasal spray, manually compressing her nose with gauze and stating that she can taste blood in the back of her throat.    PAST MEDICAL & SURGICAL HISTORY:  Other depression  Essential hypertension  HLD (hyperlipidemia)  Congestive heart failure (CHF)  Diabetes mellitus  Obesity  H/O hysterectomy for benign disease    Allergies    No Known Allergies    Intolerances      MEDICATIONS  (STANDING):  phenylephrine 0.5% Nasal 1 Spray(s) Nasal Once    MEDICATIONS  (PRN):    Social History: non-contributory    Family history: non-contributory    ROS:   ENT: all negative except as noted in HPI   CV: denies palpitations  Pulm: denies SOB, cough, hemoptysis  GI: denies change in partite indigestion, n/v  : denies pertinent urinary symptoms, urgency  Neuro: denies numbness/tingling, loss of sensation  Psych: denies anxiety  MS: denies muscle weakness, instability  Heme: denies easy bruising or bleeding  Endo: denies heat/cold intolerance, excessive sweating  Vascular: denies LE edema    Vital Signs Last 24 Hrs  T(C): --  T(F): --  HR: 99 (22 Oct 2024 14:09) (99 - 99)  BP: 165/96 (22 Oct 2024 14:09) (165/96 - 165/96)  BP(mean): --  RR: 20 (22 Oct 2024 14:09) (20 - 20)  SpO2: 95% (22 Oct 2024 14:09) (95% - 95%)    Parameters below as of 22 Oct 2024 14:09  Patient On (Oxygen Delivery Method): room air    PHYSICAL EXAM:  Gen: NAD  Skin: No rashes, bruises, or lesions  Head: Normocephalic, Atraumatic  Face: no edema, erythema, or fluctuance. Parotid glands soft without mass  Eyes: no scleral injection  Nose: No obvious evidence of bleeding in the left or right nare.   Mouth: No Stridor / Drooling / Trismus.  Dark red blood noted in the posterior oropharynx. Large dark clot was expectorated during examination. 1 x 1 cm purple lesion on the most left anterolateral aspect of the tongue.  Neck: Flat, supple, no lymphadenopathy, trachea midline, no masses  Lymphatic: No lymphadenopathy  Resp: breathing easily, no stridor  CV: no peripheral edema/cyanosis  GI: nondistended   Peripheral vascular: no JVD or edema  Neuro: facial nerve intact, no facial droop    Diagnostic Nasal Endoscopy: (Scope #2 used)  Reason for Laryngoscopy:    Patient was unable to cooperate with mirror.  Nasopharynx demonstrates streaking of dark red blood possibly into the maxillary sinus. Right nare FOE unremarkable, no evidence of active bleeding. CC: Left nose bleed    HPI: 59 year old female with PMH of CVA in July 2024 with speech deficits, DM, CHF, HTN, HLD, on daily ASA, presenting to ED complaining of left-sided nose bleed. Patient states she had a nose bleed last week that resolved on its own. Denies CP, SOB, HA, n/v/d, abdominal pain, difficulty urinating, fevers and chills.     ENT consulted for evaluation of epistaxis. In the ED, pt was given phenylephrine nasal spray, manually compressing her nose with gauze and stating that she can taste blood in the back of her throat.    PAST MEDICAL & SURGICAL HISTORY:  Other depression  Essential hypertension  HLD (hyperlipidemia)  Congestive heart failure (CHF)  Diabetes mellitus  Obesity  H/O hysterectomy for benign disease    Allergies    No Known Allergies    Intolerances      MEDICATIONS  (STANDING):  phenylephrine 0.5% Nasal 1 Spray(s) Nasal Once    MEDICATIONS  (PRN):    Social History: non-contributory    Family history: non-contributory    ROS:   ENT: all negative except as noted in HPI   CV: denies palpitations  Pulm: denies SOB, cough, hemoptysis  GI: denies change in partite indigestion, n/v  : denies pertinent urinary symptoms, urgency  Neuro: denies numbness/tingling, loss of sensation  Psych: denies anxiety  MS: denies muscle weakness, instability  Heme: denies easy bruising or bleeding  Endo: denies heat/cold intolerance, excessive sweating  Vascular: denies LE edema    Vital Signs Last 24 Hrs  T(C): --  T(F): --  HR: 99 (22 Oct 2024 14:09) (99 - 99)  BP: 165/96 (22 Oct 2024 14:09) (165/96 - 165/96)  BP(mean): --  RR: 20 (22 Oct 2024 14:09) (20 - 20)  SpO2: 95% (22 Oct 2024 14:09) (95% - 95%)    Parameters below as of 22 Oct 2024 14:09  Patient On (Oxygen Delivery Method): room air    PHYSICAL EXAM:  Gen: NAD  Skin: No rashes, bruises, or lesions  Head: Normocephalic, Atraumatic  Face: no edema, erythema, or fluctuance. Parotid glands soft without mass  Eyes: no scleral injection  Nose: L nare: placed nasopore coated in bacitracin. R nare: no active bleeding or discharge.   Mouth: questionable av malformation at anterior tongue. No Stridor / Drooling / Trismus.  Dark red blood noted in the posterior oropharynx. Large dark clot was expectorated during examination. 1 x 1 cm purple lesion on the most left anterolateral aspect of the tongue.  Neck: Flat, supple, no lymphadenopathy, trachea midline, no masses  Lymphatic: No lymphadenopathy  Resp: breathing easily, no stridor  CV: no peripheral edema/cyanosis  GI: nondistended   Peripheral vascular: no JVD or edema  Neuro: facial nerve intact, no facial droop      Diagnostic Nasal Endoscopy  Indication for procedure: nasal congestion    "Anterior rhinoscopy insufficient to account for symptoms"    Verbal and/or written consent obtained from patient    Scope #3: flexible fiber optic telescope used with surgilube     left nare: streak of blood likely from middle meatus, no active bleeding noted  no sigmoidal septal deviation noted. Mucosa moist with scant mucus, normal inferior/middle/superior turbinates, normal inferior/superior meatus, normal fontanelles, maxillary ostia clear, sphenoethmoidal recess clear bilaterally. No polyps noted.

## 2024-10-22 NOTE — ED PROVIDER NOTE - NSFOLLOWUPINSTRUCTIONS_ED_ALL_ED_FT
Follow-up with your primary care provider within 2-3 days.     Follow-up with ears, nose, and throat (ENT) Dr. Frye in 1 week, call for an appointment.    Kristen Frye  Otolaryngology  [address] [place] [address], [address]  [place] [address]  Phone: [phone]  Fax: [phone]      Bring all printed results to discuss with your PCP.    Use over the counter saline nasal spray 4 times per day.   There is packing in the nose which will dissolve on its own.    Mild to moderate pain can be managed with Tylenol 650mg (2 regular strength tablets) every 4-6 hours.  Continue to take all other medications as directed.    Return to the emergency room immediately if your symptoms worsen or persist, or if you have a fever, new or worsening headache, neck pain/stiffness, chest pain, back pain, palpitations, trouble breathing, severe vomiting, loss of consciousness (passing out), continued bleeding from the nose, more bruising or easier bleeding than usual, confusion, or if any other concerning or questionable symptoms.

## 2024-10-22 NOTE — ED PROVIDER NOTE - OBJECTIVE STATEMENT
59-year-old female PMH of obesity, CHF, HTN, HLD, DM on daily 81mg ASA no other antiPLT or AC, presents BIBEMS from home for left-sided epistaxis since approximately 30 minutes prior to arrival.  Patient reports no preceding trauma.  Pt reports having epistaxis within the past week as well.  Patient denies dizziness, lightheadedness, syncope.

## 2024-10-22 NOTE — CONSULT NOTE ADULT - ASSESSMENT
59 year old female with PMH of CVA in July 2024 with speech deficits, DM, CHF, HTN, HLD, on daily ASA, presenting to ED complaining of left-sided nose bleed. Patient states she had a nose bleed last week that resolved on its own. Denies CP, SOB, HA, n/v/d, abdominal pain, difficulty urinating, fevers and chills.     ENT consulted for evaluation of epistaxis. In the ED, pt was given phenylephrine nasal spray, manually compressing her nose with gauze and stating that she can taste blood in the back of her throat. Nasal endoscopy demonstrates left-sided streaking of blood possibly leading to maxillary sinus.  59 year old female with PMH of CVA in July 2024 with speech deficits, DM, CHF, HTN, HLD, on daily ASA, presenting to ED complaining of left-sided nose bleed. Patient states she had a nose bleed last week that resolved on its own. Denies CP, SOB, HA, n/v/d, abdominal pain, difficulty urinating, fevers and chills.     ENT consulted for evaluation of epistaxis. In the ED, pt was given phenylephrine nasal spray, manually compressing her nose with gauze and stating that she can taste blood in the back of her throat. Nasal endoscopy demonstrates left-sided streaking of blood likely from middle meatus. Nasopore placed in left nare, pt tolerated procedure well. 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

## 2024-10-22 NOTE — ED PROVIDER NOTE - CARE PROVIDER_API CALL
Kristen Frye  Otolaryngology  [address] [place] [address], [address]  [place] [address]  Phone: [phone]  Fax: [phone]  Follow Up Time: 4-6 Days

## 2024-11-20 PROCEDURE — 99284 EMERGENCY DEPT VISIT MOD MDM: CPT | Mod: 25

## 2024-11-20 PROCEDURE — 85027 COMPLETE CBC AUTOMATED: CPT

## 2024-11-20 PROCEDURE — 85610 PROTHROMBIN TIME: CPT

## 2024-11-20 PROCEDURE — 80053 COMPREHEN METABOLIC PANEL: CPT

## 2024-11-20 PROCEDURE — 85730 THROMBOPLASTIN TIME PARTIAL: CPT

## 2024-11-20 PROCEDURE — 36000 PLACE NEEDLE IN VEIN: CPT
